# Patient Record
(demographics unavailable — no encounter records)

---

## 2025-07-10 NOTE — REASON FOR VISIT
[Behavioral Health Urgent Care Assessment] : a behavioral health urgent care assessment [Patient] : patient [Mother] : with mother [Self] : self

## 2025-07-10 NOTE — PLAN
[Provision of National Suicide Prevention Lifeline 0-228-889-TALK (5587)] : Provision of national suicide prevention lifeline 0-455-480-talk (4466) [Contact was Attempted] : contact was attempted [Patient] : patient [Family] : family [Education provided regarding environmental safety/ lethal means restriction] : Education provided regarding environmental safety/ lethal means restriction

## 2025-07-10 NOTE — PLAN
[Provision of National Suicide Prevention Lifeline 7-829-995-TALK (0808)] : Provision of national suicide prevention lifeline 1-312-587-talk (0265) [Contact was Attempted] : contact was attempted [Patient] : patient [Family] : family [Education provided regarding environmental safety/ lethal means restriction] : Education provided regarding environmental safety/ lethal means restriction

## 2025-07-10 NOTE — DISCUSSION/SUMMARY
[Low acute suicide risk] : Low acute suicide risk [No] : No [Yes] : Safety Plan completed/updated (for individuals at risk): Yes

## 2025-07-10 NOTE — RISK ASSESSMENT
[Clinical Interview] : Clinical Interview [Collateral Sources] : Collateral Sources [Mood disorder] : mood disorder [ADHD] : ADHD [Identifies reasons for living] : identifies reasons for living [Supportive social network of family or friends] : supportive social network of family or friends [Fear of death/actual act of killing self] : fear of death or the actual act of killing self [None in the patient's lifetime] : None in the patient's lifetime [None Known] : none known [No] : no [Residential stability] : residential stability [Relationship stability] : relationship stability [Yes] : yes [Severe anxiety, agitation or panic] : severe anxiety, agitation or panic [Non-compliant or not receiving treatment] : non-compliant or not receiving treatment [None known] : None known [Yes, within past 3 months] : yes, within past 3 months [Affective dysregulation] : affective dysregulation [Impulsivity] : impulsivity [Irritability] : irritability

## 2025-07-23 NOTE — PHYSICAL EXAM
[Euthymic] : euthymic [Full] : full [Clear] : clear [Linear/Goal Directed] : linear/goal directed [Average] : average [WNL] : within normal limits

## 2025-07-23 NOTE — REASON FOR VISIT
[Patient with collateral] : Patient with collateral  [Mother] : mother [FreeTextEntry1] : depression/anxiety/ADHD/binge eating/cutting/cannabis use/opposition

## 2025-07-23 NOTE — END OF VISIT
[Licensed Clinician] : Licensed Clinician [Individual Psychotherapy for 53+ minutes] : Individual Psychotherapy for 53+ minutes

## 2025-07-23 NOTE — PLAN
[Cognitive and/or Behavior Therapy] : Cognitive and/or Behavior Therapy  [Dialectical Behavior Therapy] : Dialectical Behavior Therapy  [Family Based Therapy] : Family Based Therapy [Motivational Interviewing] : Motivational Interviewing  [Psychoeducation] : Psychoeducation  [Supportive Therapy] : Supportive Therapy [FreeTextEntry2] : provide education, reduce symptoms, improve communication, and develop coping skills. [de-identified] : Session focused on gathering information, developing rapport, providing psychoeducation, identifying goals, and providing support. Provider met with pt and mother. Provider inquired if mom has followed up with recommendations provided by [facility] and mom responded no. Mom appeared unaware that recommendations were already provided as she did not accompany pt to previous appt on 7/10/25. Pt reports she does not want to be here today and throughout the appt was looking at her cell phone. Pt arrived with hair nicely highlighted, nails done, and dressed nicely. Provided inquired if pt was employed and pt is not. Mom reports pt is currently in summer school and has already missed 2 days out of 14, with another month of school left to go. Pt is permitted to miss a total of 3 days. Pt reports she is completing necessary work. Pt reports she uses cannabis daily and was unable to identify why she is using. Pt denies that cannabis use to help reduce any symptoms of depression or anxiety. Pt identifies her primary difficulty related to her binge eating. Pt also reports not doing anything different since being evaluated by Dr. Fe Graham on 7/10/2025. Pt is not supervised at home as mom leaves for work early. Pt reports having peers who use cannabis.  Provided psychoeducation on the role of therapy, anxiety reduction, and role of cannabis use in her limited functioning and brain development. Pt appears superficially receptive to information but acknowledges having anxiety. Showed video on anxiety and need to manage it. Mom reports no consequences for behaviors as mom continues to pay for all things for pt, including cell phone use. Mom reports once incident of removing the cell phone but denies any change in pt's behavior.  Attempted to ascertain pt's motivation for change. Pt is ambivalent and unmotivated as evidenced by her responses. Pt unable to identify barriers. Pt unable to identify reasons for change.  Pt recognizes she is responsible for making her life better but appears helpless in being able to do so. 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 However, pt has not sought any employment.  Provided psychoeducation on dialectics and managing mood/behavior.  Discussed the need for higher level of care, as well as supervision, pt refuses to comply with medication or any recommendations. Pt also complains that her current tx provider is not helpful and pt is not meeting with provider on weekly basis. Discussed the role of negative peer affiliations impacting her behaviors and future. Discussed concerns about PINS and mom reports some past h/o of PINS on the part of a maternal uncle, which appears to play a role on mom hesitation to follow through on PINS.  At times, both mom and pt appear tearful but pt does not exhibit empathy towards mom or insight into any behavior change.  Pt denies current SI/I/P and self-injurious behavior. Mom denies current safety concerns. [FreeTextEntry1] : Provider will continue to meet with pt until she is connected to high level care. Mom appears more interested in PINS and IOP. Informed her provider will contact care coordinator to obtain additional information as mom has concerns to pay.